# Patient Record
Sex: FEMALE | Race: WHITE | Employment: UNEMPLOYED | URBAN - METROPOLITAN AREA
[De-identification: names, ages, dates, MRNs, and addresses within clinical notes are randomized per-mention and may not be internally consistent; named-entity substitution may affect disease eponyms.]

---

## 2017-08-11 ENCOUNTER — HOSPITAL ENCOUNTER (EMERGENCY)
Age: 27
Discharge: HOME | End: 2017-08-11
Attending: EMERGENCY MEDICINE
Payer: MEDICAID

## 2017-08-11 VITALS
HEART RATE: 95 BPM | TEMPERATURE: 98.5 F | WEIGHT: 140 LBS | BODY MASS INDEX: 23.3 KG/M2 | RESPIRATION RATE: 16 BRPM | OXYGEN SATURATION: 99 % | SYSTOLIC BLOOD PRESSURE: 118 MMHG | DIASTOLIC BLOOD PRESSURE: 73 MMHG

## 2017-08-11 DIAGNOSIS — Z34.90 PREGNANCY, UNSPECIFIED GESTATIONAL AGE: Primary | ICD-10-CM

## 2017-08-11 DIAGNOSIS — R11.0 NAUSEA: ICD-10-CM

## 2017-08-11 LAB
BILIRUBIN URINE: NEGATIVE
CHP ED QC CHECK: YES
COLOR: YELLOW
COMMENT UA: ABNORMAL
GLUCOSE URINE: NEGATIVE
KETONES, URINE: ABNORMAL
LEUKOCYTE ESTERASE, URINE: NEGATIVE
NITRITE, URINE: NEGATIVE
PH UA: 5 (ref 5–8)
PREGNANCY TEST URINE, POC: POSITIVE
PROTEIN UA: NEGATIVE
SPECIFIC GRAVITY UA: 1.03 (ref 1–1.03)
TURBIDITY: CLEAR
URINE HGB: NEGATIVE
UROBILINOGEN, URINE: NORMAL

## 2017-08-11 PROCEDURE — G0383 LEV 4 HOSP TYPE B ED VISIT: HCPCS

## 2017-08-11 PROCEDURE — 81003 URINALYSIS AUTO W/O SCOPE: CPT

## 2017-08-11 PROCEDURE — 87086 URINE CULTURE/COLONY COUNT: CPT

## 2017-08-11 RX ORDER — LANOLIN ALCOHOL/MO/W.PET/CERES
50 CREAM (GRAM) TOPICAL DAILY
Qty: 30 TABLET | Refills: 0 | Status: ON HOLD | OUTPATIENT
Start: 2017-08-11 | End: 2017-11-11 | Stop reason: HOSPADM

## 2017-08-11 ASSESSMENT — ENCOUNTER SYMPTOMS
NAUSEA: 1
CONSTIPATION: 0
RHINORRHEA: 0
ABDOMINAL PAIN: 0
DIARRHEA: 0
SHORTNESS OF BREATH: 0
SORE THROAT: 0
VOMITING: 0
BACK PAIN: 0
CHEST TIGHTNESS: 0
BLOOD IN STOOL: 0

## 2017-08-12 LAB
CULTURE: NORMAL
CULTURE: NORMAL
Lab: NORMAL
SPECIMEN DESCRIPTION: NORMAL
STATUS: NORMAL

## 2017-08-28 ENCOUNTER — OFFICE VISIT (OUTPATIENT)
Dept: OBGYN | Age: 27
End: 2017-08-28
Payer: MEDICAID

## 2017-08-28 ENCOUNTER — HOSPITAL ENCOUNTER (OUTPATIENT)
Age: 27
Setting detail: SPECIMEN
Discharge: HOME | End: 2017-08-28
Payer: COMMERCIAL

## 2017-08-28 VITALS
HEIGHT: 65 IN | WEIGHT: 152.9 LBS | SYSTOLIC BLOOD PRESSURE: 110 MMHG | HEART RATE: 76 BPM | BODY MASS INDEX: 25.47 KG/M2 | DIASTOLIC BLOOD PRESSURE: 70 MMHG

## 2017-08-28 DIAGNOSIS — R87.613 HSIL ON PAP SMEAR OF CERVIX: ICD-10-CM

## 2017-08-28 DIAGNOSIS — Z34.90 PREGNANCY, UNSPECIFIED GESTATIONAL AGE: ICD-10-CM

## 2017-08-28 DIAGNOSIS — Z98.890 H/O LEEP: ICD-10-CM

## 2017-08-28 DIAGNOSIS — Z72.0 TOBACCO ABUSE: ICD-10-CM

## 2017-08-28 DIAGNOSIS — Z34.90 PREGNANCY, UNSPECIFIED GESTATIONAL AGE: Primary | ICD-10-CM

## 2017-08-28 DIAGNOSIS — Z32.01 POSITIVE PREGNANCY TEST: ICD-10-CM

## 2017-08-28 DIAGNOSIS — Z72.0 TOBACCO USE: ICD-10-CM

## 2017-08-28 LAB — HCG QUANTITATIVE: ABNORMAL IU/L

## 2017-08-28 PROCEDURE — 84702 CHORIONIC GONADOTROPIN TEST: CPT

## 2017-08-28 PROCEDURE — 36415 COLL VENOUS BLD VENIPUNCTURE: CPT

## 2017-08-28 PROCEDURE — 99202 OFFICE O/P NEW SF 15 MIN: CPT | Performed by: OBSTETRICS & GYNECOLOGY

## 2017-08-31 DIAGNOSIS — Z34.90 PREGNANCY, UNSPECIFIED GESTATIONAL AGE: Primary | ICD-10-CM

## 2017-09-11 ENCOUNTER — TELEPHONE (OUTPATIENT)
Dept: OBGYN | Age: 27
End: 2017-09-11

## 2017-10-12 ENCOUNTER — TELEPHONE (OUTPATIENT)
Dept: OBGYN | Age: 27
End: 2017-10-12

## 2017-11-10 ENCOUNTER — HOSPITAL ENCOUNTER (OUTPATIENT)
Age: 27
Discharge: HOME | End: 2017-11-10
Attending: OBSTETRICS & GYNECOLOGY | Admitting: OBSTETRICS & GYNECOLOGY
Payer: MEDICAID

## 2017-11-10 VITALS
DIASTOLIC BLOOD PRESSURE: 64 MMHG | TEMPERATURE: 98.4 F | SYSTOLIC BLOOD PRESSURE: 116 MMHG | HEART RATE: 105 BPM | RESPIRATION RATE: 15 BRPM

## 2017-11-10 PROCEDURE — 99234 HOSP IP/OBS SM DT SF/LOW 45: CPT | Performed by: OBSTETRICS & GYNECOLOGY

## 2017-11-10 PROCEDURE — 99213 OFFICE O/P EST LOW 20 MIN: CPT

## 2017-11-11 ENCOUNTER — ANESTHESIA EVENT (OUTPATIENT)
Dept: LABOR AND DELIVERY | Age: 27
End: 2017-11-11
Payer: MEDICAID

## 2017-11-11 ENCOUNTER — HOSPITAL ENCOUNTER (EMERGENCY)
Age: 27
Discharge: HOME | End: 2017-11-11
Attending: EMERGENCY MEDICINE
Payer: MEDICAID

## 2017-11-11 ENCOUNTER — HOSPITAL ENCOUNTER (OUTPATIENT)
Age: 27
Setting detail: OBSERVATION
Discharge: HOME | End: 2017-11-11
Attending: OBSTETRICS & GYNECOLOGY | Admitting: OBSTETRICS & GYNECOLOGY
Payer: MEDICAID

## 2017-11-11 ENCOUNTER — ANESTHESIA (OUTPATIENT)
Dept: LABOR AND DELIVERY | Age: 27
End: 2017-11-11
Payer: MEDICAID

## 2017-11-11 VITALS
TEMPERATURE: 97.6 F | WEIGHT: 153 LBS | BODY MASS INDEX: 25.49 KG/M2 | SYSTOLIC BLOOD PRESSURE: 114 MMHG | HEART RATE: 91 BPM | HEIGHT: 65 IN | DIASTOLIC BLOOD PRESSURE: 66 MMHG | OXYGEN SATURATION: 100 % | RESPIRATION RATE: 24 BRPM

## 2017-11-11 VITALS
HEART RATE: 67 BPM | WEIGHT: 153 LBS | RESPIRATION RATE: 16 BRPM | BODY MASS INDEX: 25.49 KG/M2 | SYSTOLIC BLOOD PRESSURE: 81 MMHG | OXYGEN SATURATION: 100 % | TEMPERATURE: 98.9 F | HEIGHT: 65 IN | DIASTOLIC BLOOD PRESSURE: 45 MMHG

## 2017-11-11 VITALS — DIASTOLIC BLOOD PRESSURE: 48 MMHG | SYSTOLIC BLOOD PRESSURE: 116 MMHG | OXYGEN SATURATION: 93 %

## 2017-11-11 DIAGNOSIS — O03.9 SPONTANEOUS ABORTION: Primary | ICD-10-CM

## 2017-11-11 PROBLEM — O42.112 PRETERM PREMATURE RUPTURE OF MEMBRANES (PPROM) WITH ONSET OF LABOR AFTER 24 HOURS OF RUPTURE IN SECOND TRIMESTER, ANTEPARTUM: Status: ACTIVE | Noted: 2017-11-11

## 2017-11-11 PROBLEM — O60.02 PRETERM LABOR IN SECOND TRIMESTER: Status: ACTIVE | Noted: 2017-11-11

## 2017-11-11 PROBLEM — Z32.01 POSITIVE PREGNANCY TEST: Status: RESOLVED | Noted: 2017-08-28 | Resolved: 2017-11-11

## 2017-11-11 PROBLEM — F12.91 HISTORY OF MARIJUANA USE: Status: ACTIVE | Noted: 2017-11-11

## 2017-11-11 PROBLEM — O36.4XX0 FETAL DEMISE AFFECTING DELIVERY: Status: ACTIVE | Noted: 2017-11-11

## 2017-11-11 LAB
ABO/RH: NORMAL
ABSOLUTE EOS #: 0 K/UL (ref 0–0.4)
ABSOLUTE IMMATURE GRANULOCYTE: ABNORMAL K/UL (ref 0–0.3)
ABSOLUTE LYMPH #: 2.42 K/UL (ref 1–4.8)
ABSOLUTE MONO #: 0.64 K/UL (ref 0.1–1.3)
ALBUMIN SERPL-MCNC: 3.5 G/DL (ref 3.5–5.2)
ALBUMIN/GLOBULIN RATIO: ABNORMAL (ref 1–2.5)
ALP BLD-CCNC: 88 U/L (ref 35–104)
ALT SERPL-CCNC: 14 U/L (ref 5–33)
ANION GAP SERPL CALCULATED.3IONS-SCNC: 14 MMOL/L (ref 9–17)
ANTIBODY SCREEN: NEGATIVE
ARM BAND NUMBER: NORMAL
AST SERPL-CCNC: 14 U/L
BASOPHILS # BLD: 0 %
BASOPHILS ABSOLUTE: 0 K/UL (ref 0–0.2)
BILIRUB SERPL-MCNC: <0.15 MG/DL (ref 0.3–1.2)
BLOOD BANK COMMENT: NORMAL
BUN BLDV-MCNC: 10 MG/DL (ref 6–20)
BUN/CREAT BLD: ABNORMAL (ref 9–20)
CALCIUM SERPL-MCNC: 9.3 MG/DL (ref 8.6–10.4)
CHLORIDE BLD-SCNC: 102 MMOL/L (ref 98–107)
CO2: 20 MMOL/L (ref 20–31)
CREAT SERPL-MCNC: <0.4 MG/DL (ref 0.5–0.9)
DIFFERENTIAL TYPE: ABNORMAL
EOSINOPHILS RELATIVE PERCENT: 0 %
EXPIRATION DATE: NORMAL
FIBRINOGEN: 468 MG/DL (ref 170–410)
GFR AFRICAN AMERICAN: ABNORMAL ML/MIN
GFR NON-AFRICAN AMERICAN: ABNORMAL ML/MIN
GFR SERPL CREATININE-BSD FRML MDRD: ABNORMAL ML/MIN/{1.73_M2}
GFR SERPL CREATININE-BSD FRML MDRD: ABNORMAL ML/MIN/{1.73_M2}
GLUCOSE BLD-MCNC: 99 MG/DL (ref 70–99)
HCT VFR BLD CALC: 34.6 % (ref 36–46)
HEMOGLOBIN: 11.9 G/DL (ref 12–16)
IMMATURE GRANULOCYTES: ABNORMAL %
INR BLD: 0.9
LYMPHOCYTES # BLD: 15 %
MAGNESIUM: 1.8 MG/DL (ref 1.6–2.6)
MCH RBC QN AUTO: 32 PG (ref 26–34)
MCHC RBC AUTO-ENTMCNC: 34.4 G/DL (ref 31–37)
MCV RBC AUTO: 93.1 FL (ref 80–100)
MONOCYTES # BLD: 4 %
MORPHOLOGY: ABNORMAL
PARTIAL THROMBOPLASTIN TIME: 28.3 SEC (ref 23–31)
PDW BLD-RTO: 13.1 % (ref 11.5–14.9)
PLATELET # BLD: 253 K/UL (ref 150–450)
PLATELET ESTIMATE: ABNORMAL
PMV BLD AUTO: 8 FL (ref 6–12)
POTASSIUM SERPL-SCNC: 3.9 MMOL/L (ref 3.7–5.3)
PROTHROMBIN TIME: 9.4 SEC (ref 9.7–12)
RBC # BLD: 3.71 M/UL (ref 4–5.2)
RBC # BLD: ABNORMAL 10*6/UL
SEG NEUTROPHILS: 81 %
SEGMENTED NEUTROPHILS ABSOLUTE COUNT: 13.04 K/UL (ref 1.3–9.1)
SODIUM BLD-SCNC: 136 MMOL/L (ref 135–144)
TOTAL PROTEIN: 6.5 G/DL (ref 6.4–8.3)
WBC # BLD: 16.1 K/UL (ref 3.5–11)
WBC # BLD: ABNORMAL 10*3/UL

## 2017-11-11 PROCEDURE — 6370000000 HC RX 637 (ALT 250 FOR IP): Performed by: OBSTETRICS & GYNECOLOGY

## 2017-11-11 PROCEDURE — 3600000002 HC SURGERY LEVEL 2 BASE: Performed by: OBSTETRICS & GYNECOLOGY

## 2017-11-11 PROCEDURE — 86850 RBC ANTIBODY SCREEN: CPT

## 2017-11-11 PROCEDURE — 6360000002 HC RX W HCPCS

## 2017-11-11 PROCEDURE — 2580000003 HC RX 258: Performed by: ANESTHESIOLOGY

## 2017-11-11 PROCEDURE — 6360000002 HC RX W HCPCS: Performed by: OBSTETRICS & GYNECOLOGY

## 2017-11-11 PROCEDURE — 3700000001 HC ADD 15 MINUTES (ANESTHESIA)

## 2017-11-11 PROCEDURE — 3609079900 HC CESAREAN SECTION: Performed by: OBSTETRICS & GYNECOLOGY

## 2017-11-11 PROCEDURE — 7100000000 HC PACU RECOVERY - FIRST 15 MIN: Performed by: OBSTETRICS & GYNECOLOGY

## 2017-11-11 PROCEDURE — 2500000003 HC RX 250 WO HCPCS: Performed by: ANESTHESIOLOGY

## 2017-11-11 PROCEDURE — 96361 HYDRATE IV INFUSION ADD-ON: CPT

## 2017-11-11 PROCEDURE — 96365 THER/PROPH/DIAG IV INF INIT: CPT

## 2017-11-11 PROCEDURE — 85384 FIBRINOGEN ACTIVITY: CPT

## 2017-11-11 PROCEDURE — 6370000000 HC RX 637 (ALT 250 FOR IP): Performed by: STUDENT IN AN ORGANIZED HEALTH CARE EDUCATION/TRAINING PROGRAM

## 2017-11-11 PROCEDURE — 7100000001 HC PACU RECOVERY - ADDTL 15 MIN: Performed by: OBSTETRICS & GYNECOLOGY

## 2017-11-11 PROCEDURE — 2580000003 HC RX 258: Performed by: OBSTETRICS & GYNECOLOGY

## 2017-11-11 PROCEDURE — 3700000001 HC ADD 15 MINUTES (ANESTHESIA): Performed by: OBSTETRICS & GYNECOLOGY

## 2017-11-11 PROCEDURE — 86901 BLOOD TYPING SEROLOGIC RH(D): CPT

## 2017-11-11 PROCEDURE — 83735 ASSAY OF MAGNESIUM: CPT

## 2017-11-11 PROCEDURE — 2500000003 HC RX 250 WO HCPCS

## 2017-11-11 PROCEDURE — 6370000000 HC RX 637 (ALT 250 FOR IP)

## 2017-11-11 PROCEDURE — G0378 HOSPITAL OBSERVATION PER HR: HCPCS

## 2017-11-11 PROCEDURE — 59200 INSERT CERVICAL DILATOR: CPT

## 2017-11-11 PROCEDURE — 2580000003 HC RX 258: Performed by: EMERGENCY MEDICINE

## 2017-11-11 PROCEDURE — 3700000000 HC ANESTHESIA ATTENDED CARE

## 2017-11-11 PROCEDURE — 88305 TISSUE EXAM BY PATHOLOGIST: CPT

## 2017-11-11 PROCEDURE — 86900 BLOOD TYPING SEROLOGIC ABO: CPT

## 2017-11-11 PROCEDURE — 85025 COMPLETE CBC W/AUTO DIFF WBC: CPT

## 2017-11-11 PROCEDURE — 96375 TX/PRO/DX INJ NEW DRUG ADDON: CPT

## 2017-11-11 PROCEDURE — 36415 COLL VENOUS BLD VENIPUNCTURE: CPT

## 2017-11-11 PROCEDURE — 80053 COMPREHEN METABOLIC PANEL: CPT

## 2017-11-11 PROCEDURE — 96372 THER/PROPH/DIAG INJ SC/IM: CPT

## 2017-11-11 PROCEDURE — 96360 HYDRATION IV INFUSION INIT: CPT

## 2017-11-11 PROCEDURE — 59414 DELIVER PLACENTA: CPT | Performed by: OBSTETRICS & GYNECOLOGY

## 2017-11-11 PROCEDURE — 3700000025 ANESTHESIA EPIDURAL BLOCK: Performed by: ANESTHESIOLOGY

## 2017-11-11 PROCEDURE — 99285 EMERGENCY DEPT VISIT HI MDM: CPT

## 2017-11-11 PROCEDURE — 85730 THROMBOPLASTIN TIME PARTIAL: CPT

## 2017-11-11 PROCEDURE — 96366 THER/PROPH/DIAG IV INF ADDON: CPT

## 2017-11-11 PROCEDURE — 3600000012 HC SURGERY LEVEL 2 ADDTL 15MIN: Performed by: OBSTETRICS & GYNECOLOGY

## 2017-11-11 PROCEDURE — 85610 PROTHROMBIN TIME: CPT

## 2017-11-11 PROCEDURE — 3700000000 HC ANESTHESIA ATTENDED CARE: Performed by: OBSTETRICS & GYNECOLOGY

## 2017-11-11 PROCEDURE — 6360000002 HC RX W HCPCS: Performed by: ANESTHESIOLOGY

## 2017-11-11 RX ORDER — SODIUM CHLORIDE, SODIUM LACTATE, POTASSIUM CHLORIDE, CALCIUM CHLORIDE 600; 310; 30; 20 MG/100ML; MG/100ML; MG/100ML; MG/100ML
INJECTION, SOLUTION INTRAVENOUS CONTINUOUS PRN
Status: DISCONTINUED | OUTPATIENT
Start: 2017-11-11 | End: 2017-11-12 | Stop reason: SDUPTHER

## 2017-11-11 RX ORDER — HYDROCODONE BITARTRATE AND ACETAMINOPHEN 5; 325 MG/1; MG/1
2 TABLET ORAL EVERY 4 HOURS PRN
Status: DISCONTINUED | OUTPATIENT
Start: 2017-11-11 | End: 2017-11-11 | Stop reason: HOSPADM

## 2017-11-11 RX ORDER — ACETAMINOPHEN 500 MG
1000 TABLET ORAL ONCE
Status: DISCONTINUED | OUTPATIENT
Start: 2017-11-11 | End: 2017-11-11 | Stop reason: HOSPADM

## 2017-11-11 RX ORDER — SODIUM CHLORIDE 0.9 % (FLUSH) 0.9 %
10 SYRINGE (ML) INJECTION PRN
Status: DISCONTINUED | OUTPATIENT
Start: 2017-11-11 | End: 2017-11-11

## 2017-11-11 RX ORDER — MORPHINE SULFATE 10 MG/ML
10 INJECTION, SOLUTION INTRAMUSCULAR; INTRAVENOUS ONCE
Status: COMPLETED | OUTPATIENT
Start: 2017-11-11 | End: 2017-11-11

## 2017-11-11 RX ORDER — HYDROCODONE BITARTRATE AND ACETAMINOPHEN 5; 325 MG/1; MG/1
1 TABLET ORAL EVERY 6 HOURS PRN
Qty: 5 TABLET | Refills: 0 | Status: SHIPPED | OUTPATIENT
Start: 2017-11-11 | End: 2017-11-18

## 2017-11-11 RX ORDER — NALOXONE HYDROCHLORIDE 0.4 MG/ML
0.4 INJECTION, SOLUTION INTRAMUSCULAR; INTRAVENOUS; SUBCUTANEOUS PRN
Status: DISCONTINUED | OUTPATIENT
Start: 2017-11-11 | End: 2017-11-11

## 2017-11-11 RX ORDER — HYDROCODONE BITARTRATE AND ACETAMINOPHEN 5; 325 MG/1; MG/1
1 TABLET ORAL EVERY 4 HOURS PRN
Status: DISCONTINUED | OUTPATIENT
Start: 2017-11-11 | End: 2017-11-11 | Stop reason: HOSPADM

## 2017-11-11 RX ORDER — FENTANYL CITRATE 50 UG/ML
INJECTION, SOLUTION INTRAMUSCULAR; INTRAVENOUS PRN
Status: DISCONTINUED | OUTPATIENT
Start: 2017-11-11 | End: 2017-11-12 | Stop reason: SDUPTHER

## 2017-11-11 RX ORDER — ROPIVACAINE HYDROCHLORIDE 2 MG/ML
INJECTION, SOLUTION EPIDURAL; INFILTRATION; PERINEURAL CONTINUOUS PRN
Status: DISCONTINUED | OUTPATIENT
Start: 2017-11-11 | End: 2017-11-12 | Stop reason: SDUPTHER

## 2017-11-11 RX ORDER — DOXYCYCLINE 100 MG/1
200 CAPSULE ORAL ONCE
Status: COMPLETED | OUTPATIENT
Start: 2017-11-11 | End: 2017-11-11

## 2017-11-11 RX ORDER — LIDOCAINE HYDROCHLORIDE 10 MG/ML
INJECTION, SOLUTION EPIDURAL; INFILTRATION; INTRACAUDAL; PERINEURAL PRN
Status: DISCONTINUED | OUTPATIENT
Start: 2017-11-11 | End: 2017-11-11 | Stop reason: SDUPTHER

## 2017-11-11 RX ORDER — NALBUPHINE HCL 10 MG/ML
10 AMPUL (ML) INJECTION ONCE
Status: COMPLETED | OUTPATIENT
Start: 2017-11-11 | End: 2017-11-11

## 2017-11-11 RX ORDER — IBUPROFEN 800 MG/1
800 TABLET ORAL EVERY 8 HOURS PRN
Qty: 30 TABLET | Refills: 0 | Status: SHIPPED | OUTPATIENT
Start: 2017-11-11 | End: 2020-06-13

## 2017-11-11 RX ORDER — EPHEDRINE SULFATE 50 MG/ML
INJECTION, SOLUTION INTRAVENOUS PRN
Status: DISCONTINUED | OUTPATIENT
Start: 2017-11-11 | End: 2017-11-11 | Stop reason: SDUPTHER

## 2017-11-11 RX ORDER — ONDANSETRON 2 MG/ML
4 INJECTION INTRAMUSCULAR; INTRAVENOUS EVERY 6 HOURS PRN
Status: DISCONTINUED | OUTPATIENT
Start: 2017-11-11 | End: 2017-11-11 | Stop reason: HOSPADM

## 2017-11-11 RX ORDER — ONDANSETRON 2 MG/ML
4 INJECTION INTRAMUSCULAR; INTRAVENOUS EVERY 6 HOURS PRN
Status: DISCONTINUED | OUTPATIENT
Start: 2017-11-11 | End: 2017-11-11

## 2017-11-11 RX ORDER — PROPOFOL 10 MG/ML
INJECTION, EMULSION INTRAVENOUS PRN
Status: DISCONTINUED | OUTPATIENT
Start: 2017-11-11 | End: 2017-11-11 | Stop reason: SDUPTHER

## 2017-11-11 RX ORDER — SODIUM CHLORIDE, SODIUM LACTATE, POTASSIUM CHLORIDE, CALCIUM CHLORIDE 600; 310; 30; 20 MG/100ML; MG/100ML; MG/100ML; MG/100ML
INJECTION, SOLUTION INTRAVENOUS CONTINUOUS
Status: DISCONTINUED | OUTPATIENT
Start: 2017-11-11 | End: 2017-11-11

## 2017-11-11 RX ORDER — FENTANYL CITRATE 50 UG/ML
INJECTION, SOLUTION INTRAMUSCULAR; INTRAVENOUS
Status: COMPLETED
Start: 2017-11-11 | End: 2017-11-11

## 2017-11-11 RX ORDER — MIDAZOLAM HYDROCHLORIDE 1 MG/ML
INJECTION INTRAMUSCULAR; INTRAVENOUS PRN
Status: DISCONTINUED | OUTPATIENT
Start: 2017-11-11 | End: 2017-11-11 | Stop reason: SDUPTHER

## 2017-11-11 RX ORDER — EPHEDRINE SULFATE 50 MG/ML
INJECTION, SOLUTION INTRAVENOUS
Status: DISCONTINUED
Start: 2017-11-11 | End: 2017-11-11 | Stop reason: WASHOUT

## 2017-11-11 RX ORDER — MISOPROSTOL 200 UG/1
400 TABLET ORAL
Status: DISCONTINUED | OUTPATIENT
Start: 2017-11-11 | End: 2017-11-11

## 2017-11-11 RX ORDER — PROMETHAZINE HYDROCHLORIDE 25 MG/ML
25 INJECTION, SOLUTION INTRAMUSCULAR; INTRAVENOUS ONCE
Status: COMPLETED | OUTPATIENT
Start: 2017-11-11 | End: 2017-11-11

## 2017-11-11 RX ORDER — 0.9 % SODIUM CHLORIDE 0.9 %
1000 INTRAVENOUS SOLUTION INTRAVENOUS ONCE
Status: COMPLETED | OUTPATIENT
Start: 2017-11-11 | End: 2017-11-11

## 2017-11-11 RX ORDER — ACETAMINOPHEN 500 MG
1000 TABLET ORAL EVERY 6 HOURS PRN
Status: DISCONTINUED | OUTPATIENT
Start: 2017-11-11 | End: 2017-11-11

## 2017-11-11 RX ORDER — BUPIVACAINE HYDROCHLORIDE AND EPINEPHRINE 5; 5 MG/ML; UG/ML
INJECTION, SOLUTION EPIDURAL; INTRACAUDAL; PERINEURAL PRN
Status: DISCONTINUED | OUTPATIENT
Start: 2017-11-11 | End: 2017-11-12 | Stop reason: SDUPTHER

## 2017-11-11 RX ORDER — DOXYCYCLINE 100 MG/1
100 CAPSULE ORAL ONCE
Status: COMPLETED | OUTPATIENT
Start: 2017-11-11 | End: 2017-11-11

## 2017-11-11 RX ORDER — GLYCOPYRROLATE 0.2 MG/ML
INJECTION INTRAMUSCULAR; INTRAVENOUS PRN
Status: DISCONTINUED | OUTPATIENT
Start: 2017-11-11 | End: 2017-11-11 | Stop reason: SDUPTHER

## 2017-11-11 RX ORDER — IBUPROFEN 800 MG/1
800 TABLET ORAL EVERY 8 HOURS PRN
Status: DISCONTINUED | OUTPATIENT
Start: 2017-11-11 | End: 2017-11-11 | Stop reason: HOSPADM

## 2017-11-11 RX ORDER — DOCUSATE SODIUM 100 MG/1
100 CAPSULE, LIQUID FILLED ORAL 2 TIMES DAILY PRN
Qty: 60 CAPSULE | Refills: 0 | Status: SHIPPED | OUTPATIENT
Start: 2017-11-11 | End: 2020-06-13

## 2017-11-11 RX ORDER — KETOROLAC TROMETHAMINE 30 MG/ML
INJECTION, SOLUTION INTRAMUSCULAR; INTRAVENOUS PRN
Status: DISCONTINUED | OUTPATIENT
Start: 2017-11-11 | End: 2017-11-11 | Stop reason: SDUPTHER

## 2017-11-11 RX ADMIN — SODIUM CHLORIDE, POTASSIUM CHLORIDE, SODIUM LACTATE AND CALCIUM CHLORIDE: 600; 310; 30; 20 INJECTION, SOLUTION INTRAVENOUS at 08:51

## 2017-11-11 RX ADMIN — GLYCOPYRROLATE 0.2 MG: 0.2 INJECTION, SOLUTION INTRAMUSCULAR; INTRAVENOUS at 09:46

## 2017-11-11 RX ADMIN — DOXYCYCLINE 100 MG: 100 CAPSULE ORAL at 08:58

## 2017-11-11 RX ADMIN — MISOPROSTOL 400 MCG: 200 TABLET ORAL at 01:46

## 2017-11-11 RX ADMIN — SODIUM CITRATE AND CITRIC ACID MONOHYDRATE 30 ML: 500; 334 SOLUTION ORAL at 08:38

## 2017-11-11 RX ADMIN — DOXYCYCLINE 200 MG: 100 CAPSULE ORAL at 11:37

## 2017-11-11 RX ADMIN — Medication 8 ML/HR: at 03:48

## 2017-11-11 RX ADMIN — LIDOCAINE HYDROCHLORIDE 50 MG: 10 INJECTION, SOLUTION EPIDURAL; INFILTRATION; INTRACAUDAL; PERINEURAL at 09:17

## 2017-11-11 RX ADMIN — Medication 500 ML/HR: at 09:32

## 2017-11-11 RX ADMIN — MISOPROSTOL 400 MCG: 200 TABLET ORAL at 07:59

## 2017-11-11 RX ADMIN — MORPHINE SULFATE 10 MG: 10 INJECTION, SOLUTION INTRAMUSCULAR; INTRAVENOUS at 02:42

## 2017-11-11 RX ADMIN — SODIUM CHLORIDE, POTASSIUM CHLORIDE, SODIUM LACTATE AND CALCIUM CHLORIDE: 600; 310; 30; 20 INJECTION, SOLUTION INTRAVENOUS at 03:15

## 2017-11-11 RX ADMIN — BUPIVACAINE HYDROCHLORIDE AND EPINEPHRINE 2 MG: 5; 5 INJECTION, SOLUTION EPIDURAL; INTRACAUDAL; PERINEURAL at 03:46

## 2017-11-11 RX ADMIN — ROPIVACAINE HYDROCHLORIDE 8 ML/HR: 2 INJECTION, SOLUTION EPIDURAL; INFILTRATION at 03:46

## 2017-11-11 RX ADMIN — KETOROLAC TROMETHAMINE 30 MG: 30 INJECTION, SOLUTION INTRAMUSCULAR; INTRAVENOUS at 09:33

## 2017-11-11 RX ADMIN — NALBUPHINE HYDROCHLORIDE 10 MG: 10 INJECTION, SOLUTION INTRAMUSCULAR; INTRAVENOUS; SUBCUTANEOUS at 01:40

## 2017-11-11 RX ADMIN — MISOPROSTOL 400 MCG: 200 TABLET ORAL at 04:49

## 2017-11-11 RX ADMIN — PROMETHAZINE HYDROCHLORIDE 25 MG: 25 INJECTION INTRAMUSCULAR; INTRAVENOUS at 02:42

## 2017-11-11 RX ADMIN — SODIUM CHLORIDE, POTASSIUM CHLORIDE, SODIUM LACTATE AND CALCIUM CHLORIDE: 600; 310; 30; 20 INJECTION, SOLUTION INTRAVENOUS at 01:43

## 2017-11-11 RX ADMIN — EPHEDRINE SULFATE 10 MG: 50 INJECTION INTRAMUSCULAR; INTRAVENOUS; SUBCUTANEOUS at 09:39

## 2017-11-11 RX ADMIN — SODIUM CHLORIDE 1000 ML: 9 INJECTION, SOLUTION INTRAVENOUS at 00:45

## 2017-11-11 RX ADMIN — FENTANYL CITRATE 22 MCG: 50 INJECTION, SOLUTION INTRAMUSCULAR; INTRAVENOUS at 03:46

## 2017-11-11 RX ADMIN — PHENYLEPHRINE HYDROCHLORIDE 100 MCG: 10 INJECTION INTRAVENOUS at 09:36

## 2017-11-11 RX ADMIN — HYDROCODONE BITARTRATE AND ACETAMINOPHEN 2 TABLET: 5; 325 TABLET ORAL at 11:37

## 2017-11-11 RX ADMIN — FENTANYL CITRATE 20 MCG: 50 INJECTION INTRAMUSCULAR; INTRAVENOUS at 03:38

## 2017-11-11 RX ADMIN — MIDAZOLAM HYDROCHLORIDE 2 MG: 1 INJECTION, SOLUTION INTRAMUSCULAR; INTRAVENOUS at 09:12

## 2017-11-11 RX ADMIN — PROPOFOL 120 MG: 10 INJECTION, EMULSION INTRAVENOUS at 09:17

## 2017-11-11 RX ADMIN — Medication 8 ML/HR: at 07:42

## 2017-11-11 ASSESSMENT — PAIN SCALES - GENERAL
PAINLEVEL_OUTOF10: 10
PAINLEVEL_OUTOF10: 2
PAINLEVEL_OUTOF10: 10
PAINLEVEL_OUTOF10: 7
PAINLEVEL_OUTOF10: 7
PAINLEVEL_OUTOF10: 4
PAINLEVEL_OUTOF10: 7
PAINLEVEL_OUTOF10: 10

## 2017-11-11 ASSESSMENT — LIFESTYLE VARIABLES
SMOKING_STATUS: 1
SMOKING_STATUS: 0

## 2017-11-11 ASSESSMENT — ENCOUNTER SYMPTOMS
STRIDOR: 0
SHORTNESS OF BREATH: 0

## 2017-11-11 ASSESSMENT — PAIN DESCRIPTION - PAIN TYPE: TYPE: ACUTE PAIN

## 2017-11-11 NOTE — BRIEF OP NOTE
Brief Operative Note  Department of Obstetrics and Gynecology  [facility]     Patient: Sarah Wang   : 1990  MRN: [id number]       Acct: [de-identified]   Date of Procedure: 17     Pre-operative Diagnosis: 33 y.o. female    Spontaneous  labor at 20w4d, Fetal demise, PPROM, Fetal diaphragmatic hernia, maternal tobacco use and H/O marijuana use, retained placenta    Post-operative Diagnosis: same    Procedure: Manual removal of placenta under anesthesia    Surgeon: Dr. Micheline Reynolds DO     Assistant(s): Huey Nichols DO PGY2    Anesthesia: general    Findings:  Placenta found to be in vagina. Normal appearing cervix. Anteverted uterus 2 cm below umbilicus  Total IV fluids/Blood products:  600 ml   Urine Output:  75 ml    Estimated blood loss:  10 ml of old blood  Drains:  vázquez catheter  Specimens:  Placenta and membranes  Instrument and Sponge Count: Correct  Complications:  none  Condition:  good, transferred to post anesthesia recovery    See full operative report for further details.       Huey Nichols DO  Ob/Gyn Resident   2017, 9:40 AM

## 2017-11-11 NOTE — PROGRESS NOTES
Date: 2017  Time: 9:40 AM    Patient Name: Jaquelin Wilks  Patient : 1990  Room/Bed: 8847/5390-68  Admission Date/Time: 2017  1:21 AM  MRN #: [id number]  [facility] #: [id number]      Attending Attestation:   I was present for the entire procedure.     Attending Name:  Rudi Yanes DO

## 2017-11-11 NOTE — FLOWSHEET NOTE
Pt presents to L and D, accompanied by boyfriend, via wheelchair. Pt here for SROM on Tuesday. Pt states she has been to [facility], where they found fetal anomalies, so they referred her to [facility]. From [place], she was referred to a family planning clinic in [place] to induce. Pt was denied at the family planning clinic due to rupture of membranes. Pt states she came her for another option. Pt states she has had vaginal bleeding since Tuesday, and today she began to have intense lower back pain, rating 6/10. Pt is afebrile. Will confirm FHTs with US. Pt gowned and in bed, oriented to room and call light. EFM explained and applied. Dr. Capri Costello notified of admission.

## 2017-11-11 NOTE — OP NOTE
Operative Note  Department of Obstetrics and Gynecology  [facility]       Patient: Sarah Wang   : 1990  MRN: [id number]                                                    Acct: [de-identified]   Date of Procedure: 17      Pre-operative Diagnosis: 33 y.o. female    Spontaneous  labor at 20w4d, Fetal demise, PPROM, Fetal diaphragmatic hernia, maternal tobacco use and H/O marijuana use, retained placenta     Post-operative Diagnosis: same     Procedure: Manual removal of placenta under anesthesia     Surgeon: Dr. Micheline Reynolds DO     Assistant(s): Huey Nichols DO PGY2     Anesthesia: general    Indications: This is a 33year old female that presented with vaginal pressure after she had PPROM on 17. She was seen at [facility] on 11/10 with FHTs noted. She then felt feet at the introitus while at home and called EMS. At [facility] she delivered a non-viable male infant with apgars of 0 @ 1 min and 0 @ 5 min. She received 3 doses of buccal cytotec 400 mcg to help with the removal of the placenta without success. The patient then requested surgery for removal of the placenta. All risks, benefits and alternatives were discussed with the patient and the decision was made to proceed with Suction Dilation and Curettage for removal of placenta. Procedure Details: The patient was seen in the pre-op room. The risks, benefits, complications, treatment options, and expected outcomes were discussed with the patient. The patient concurred with the proposed plan, giving informed consent. The patient was taken to the Operating Room and identified as Sarah Wang and the procedure was verified. A Time Out was held and the above information confirmed. After administration of general anesthesia, the patient was placed in the dorsolithotomy position and examination under general anesthesia performed with findings as noted above.  The patient was prepped and draped in the usual sterile fashion. The bladder was draining with a vázquez catheter in place, and a weighted speculum was placed into the vagina to visualize the cervix. The placenta was found to be coming into the vaginal vault. It was grasped with a ringed forceps and teased out and removed in one piece. The vagina and uterus were cleared of all clots and hemostasis was observed. Pitocin was started following removal of placenta. Bimanual exam revealed an empty uterus with no products of conception present. All instruments were then removed. Hemostasis was visualized. Instrument, sponge, and needle counts were correct at the conclusion of the case. SCDs for DVT prophylaxis remain in place for the post operative period. Dr. Davide White was present for the entire operation. Findings:  Placenta found to be in vagina. Normal appearing cervix.  Anteverted uterus 2 cm below umbilicus  Total IV fluids/Blood products:  600 ml   Urine Output:  75 ml    Estimated blood loss:  10 ml of old blood  Drains:  vázquez catheter  Specimens:  Placenta and membranes  Instrument and Sponge Count: Correct  Complications:  none  Condition:  good, transferred to post anesthesia recovery    Samir Bailey DO  Ob/Gyn Resident  11/11/2017, 9:45 AM

## 2017-11-11 NOTE — ANESTHESIA PRE PROCEDURE
Q89.7    H/O marijuana use Z87.898    Fetal demise (G2, 22w2d) O36. 4XX0       Past Medical History:  History reviewed. No pertinent past medical history. Past Surgical History:        Procedure Laterality Date    LEEP  4/26/16       Social History:    Social History   Substance Use Topics    Smoking status: Current Every Day Smoker     Packs/day: 0.50     Years: 7.00     Types: Cigarettes    Smokeless tobacco: Never Used    Alcohol use No                                Ready to quit: Not Answered  Counseling given: Not Answered      Vital Signs (Current):   Vitals:    11/11/17 0108   BP: (!) 100/56   Pulse: 90   Resp: 20   Temp: 98.7 °F (37.1 °C)   TempSrc: Oral                                              BP Readings from Last 3 Encounters:   11/11/17 (!) 100/56   11/11/17 114/66   11/10/17 116/64       NPO Status:                                                                                 BMI:   Wt Readings from Last 3 Encounters:   11/11/17 153 lb (69.4 kg)   08/28/17 152 lb 14.4 oz (69.4 kg)   08/11/17 140 lb (63.5 kg)     There is no height or weight on file to calculate BMI.    CBC:   Lab Results   Component Value Date    WBC 16.1 11/11/2017    RBC 3.71 11/11/2017    HGB 11.9 11/11/2017    HCT 34.6 11/11/2017    MCV 93.1 11/11/2017    RDW 13.1 11/11/2017     11/11/2017     LR    CMP:   Lab Results   Component Value Date     11/11/2017    K 3.9 11/11/2017     11/11/2017    CO2 20 11/11/2017    BUN 10 11/11/2017    CREATININE <0.40 11/11/2017    GFRAA CANNOT BE CALCULATED 11/11/2017    LABGLOM CANNOT BE CALCULATED 11/11/2017    GLUCOSE 99 11/11/2017    PROT 6.5 11/11/2017    CALCIUM 9.3 11/11/2017    BILITOT <0.15 11/11/2017    ALKPHOS 88 11/11/2017    AST 14 11/11/2017    ALT 14 11/11/2017       POC Tests: No results for input(s): POCGLU, POCNA, POCK, POCCL, POCBUN, POCHEMO, POCHCT in the last 72 hours.     Coags:   Lab Results   Component Value Date    PROTIME 9.4 11/11/2017

## 2017-11-11 NOTE — ANESTHESIA POSTPROCEDURE EVALUATION
POST- ANESTHESIA EVALUATION       Pt Name: Danny Vasques  MRN: [id number]  YOB: 1990  Date of evaluation: 2017  Time:  4:55 PM      BP (!) 81/45   Pulse 67   Temp 98.9 °F (37.2 °C) (Oral)   Resp 16   Ht 5' 5\" (1.651 m)   Wt 153 lb (69.4 kg)   LMP 2017 (Approximate)   SpO2 100%   BMI 25.46 kg/m²      Consciousness Level  Awake  Cardiopulmonary Status  Stable  Pain Adequately Treated YES  Nausea / Vomiting  NO  Adequate Hydration  YES  Anesthesia Related Complications NONE      Electronically signed by Michoacano George MD on 2017 at 4:55 PM       Department of Anesthesiology  Postprocedure Note    Patient: Danny Vasques  MRN: [id number]  YOB: 1990  Date of evaluation: 2017  Time:  4:55 PM     Procedure Summary     Date:  17 Room / Location:  [facility] L&D OR    Anesthesia Start:  315 Anesthesia Stop:      Procedures:        SECTION (N/A )      DILATATION AND CURETTAGE (N/A Vagina )      ANESTHESIA LABOR ANALGESIA Diagnosis:  (Other)    Surgeon:  Chele De Leon DO Responsible Provider:  Sharyon Councilman, MD    Anesthesia Type:  epidural ASA Status:  2          Anesthesia Type: epidural    Raul Phase I: Raul Score: 8    Raul Phase II: Raul Score: 10    Last vitals: Reviewed and per EMR flowsheets.        Anesthesia Post Evaluation

## 2017-11-11 NOTE — LETTER
[address] [place] [address]  Phone: [phone]        November 11, 2017     Patient: Rosezetta Dancer   YOB: 1990   Date of Visit: 11/11/2017       To Whom It May Concern: It is my medical opinion that Rosezetta Dancer may return to work on Monday, November 20, 2017 due to her medical condition. If you have any questions or concerns, please don't hesitate to call.     Sincerely,        Matheus Moreno, DO  Ob/Gyn Resident PGY-2  11/11/2017, 10:23 AM

## 2017-11-11 NOTE — PROGRESS NOTES
Patient seen and evaluated. Reports that she thinks the placenta is out. Vitals:    11/11/17 0346 11/11/17 0348 11/11/17 0350 11/11/17 0357   BP: 98/60 (!) 95/48 (!) 97/53 (!) 98/54   Pulse: 86 79 79 77   Resp: 18 16 16 16   Temp:       TempSrc:         Pelvic: vagina cleaned of about 200cc clots and dark blood, placenta remains in place    Will continue current management and close observation. Next dose of cytotec to be given at 0445. Reassurance given to the patient and her partner. All questions answered. Patient vocalized understanding.      Electronically signed by Yusef Patton DO on 11/11/2017 at 4:31 AM

## 2017-11-11 NOTE — FLOWSHEET NOTE
Pt felt like she had passed placenta Dr Zhu accompanied me to room relates clots aprox 200 ml noted of blood loss no active bleeding noted vss pt comfortable .

## 2017-11-11 NOTE — DISCHARGE SUMMARY
for a Suction D&C. Once under general anesthesia, the placenta was found to be in the vaginal vault and removed manually with ringed forceps. Delivery of non-viable male infant with apgars of 0 @ 1 min and 0 @ 5 min. Postpartum course: normal.      Course of patient: complicated by retained placenta that was removed successfully following manual removal under anesthesia. Discharge to: Home    Readmission planned: no     Recommendations on Discharge:     Medications:      Medication List      START taking these medications    docusate sodium 100 MG capsule  Commonly known as:  COLACE  Take 1 capsule by mouth 2 times daily as needed for Constipation     HYDROcodone-acetaminophen 5-325 MG per tablet  Commonly known as:  NORCO  Take 1 tablet by mouth every 6 hours as needed for Pain . ibuprofen 800 MG tablet  Commonly known as:  ADVIL;MOTRIN  Take 1 tablet by mouth every 8 hours as needed for Pain        STOP taking these medications    doxyLAMINE succinate 25 MG tablet  Commonly known as:  UNISOM     PRENATAL VITAMINS PLUS 27-1 MG Tabs tablet     vitamin B-6 50 MG tablet  Commonly known as:  PYRIDOXINE           Where to Get Your Medications      You can get these medications from any pharmacy    Bring a paper prescription for each of these medications  · docusate sodium 100 MG capsule  · HYDROcodone-acetaminophen 5-325 MG per tablet  · ibuprofen 800 MG tablet           Activity: pelvic rest x 6 weeks, no driving on narcotics, no lifting greater than 15 lbs  Diet: regular diet  Follow up: 2 weeks     Condition on discharge: good    Discharge date: 11/11/17      Matheus Moreno DO  Ob/Gyn Resident    Comments:  Home care and follow-up care were reviewed. Pelvic rest, and birth control were reviewed.

## 2017-11-11 NOTE — PROGRESS NOTES
Called to patient's room per RN. Patient would like to expedite the process for delivering the placenta. She does not wish to prolong the process. She requests surgery for removal of the placenta. All risks, benefits and alternatives were discussed with the patient. Will proceed with the Suction dilation and curettage as planned. Anesthesia and Dr. Susanne Viveros notified. Plan for Suction D&C on L&D @ 0900. Consent signed and in chart. Will give patient one more dose of cytotec at this time.     Sharita Ambrose,   Ob/Gyn Resident PGY-2  11/11/2017, 7:59 AM

## 2017-11-11 NOTE — ED PROVIDER NOTES
Constitutional: She is oriented to person, place, and time. She appears well-developed and well-nourished. No distress. HENT:   Head: Normocephalic and atraumatic. Nose: Nose normal.   Eyes: Conjunctivae and EOM are normal. Pupils are equal, round, and reactive to light. Right eye exhibits no discharge. Left eye exhibits no discharge. Neck: Normal range of motion. Neck supple. No tracheal deviation present. Cardiovascular: Normal rate, regular rhythm, normal heart sounds and intact distal pulses. Pulmonary/Chest: Effort normal and breath sounds normal. No stridor. No respiratory distress. She has no wheezes. She has no rales. She exhibits no tenderness. Abdominal: Soft. Bowel sounds are normal. There is no tenderness. There is no rebound and no guarding. Genitourinary:   Genitourinary Comments: Pelvic inspection done with female nurse chaperone present. There is evidence of spontaneous , I'm able to visualize fetal legs as well as umbilical cord, no active hemorrhage. Musculoskeletal: Normal range of motion. She exhibits no edema or tenderness. Neurological: She is alert and oriented to person, place, and time. No cranial nerve deficit. Coordination normal.   Skin: Skin is warm and dry. No rash noted. She is not diaphoretic. No erythema. Psychiatric: She has a normal mood and affect. Her behavior is normal. Judgment normal.       MEDICAL DECISION MAKING:   MDM  Patient is having a spontaneous . I consult the GYN immediately. I contacted our NICU at [facility], they only do  resuscitation for 23 weeks above. Patient's fetus has a gestational age of 25 weeks and 3 days, and was planning undergoing elective  this next week because her water broke earlier this week and was told that the fetus has severe congenital abnormalities.   I don't think this is a viable fetus, I am not delivering this fetus and not performing  resuscitation on this fetus.  Choosing to let the patient have a spontaneous miscarriage rather than extract the fetus because I don't want to induce a severe hemorrhage. I have consulted GYN and awaiting their arrival.  She is given IV fluids, ordered type and screen, CBC, CMP, coags. Reviewed her medical records, is a note from Matt Bahena done yesterday, patient has PPROM. 12:59 AM  OB has taken this patient to labor and delivery. Procedures    DIAGNOSTIC RESULTS     LABS: All lab results were reviewed by myself, and all abnormals are listed below. Labs Reviewed   CBC WITH AUTO DIFFERENTIAL - Abnormal; Notable for the following:        Result Value    WBC 16.1 (*)     RBC 3.71 (*)     Hemoglobin 11.9 (*)     Hematocrit 34.6 (*)     All other components within normal limits   COMPREHENSIVE METABOLIC PANEL   MAGNESIUM   PROTIME-INR   TYPE AND SCREEN     EMERGENCY DEPARTMENT COURSE:     Vitals:    Vitals:    17 0040   BP: 114/66   Pulse: 91   Resp: 24   Temp: 97.6 °F (36.4 °C)   TempSrc: Oral   SpO2: 100%   Weight: 153 lb (69.4 kg)   Height: 5' 5\" (1.651 m)     The patient was given the following medications while in the emergency department:  Orders Placed This Encounter   Medications    0.9 % sodium chloride bolus     CONSULTS:  IP CONSULT TO OB GYN    FINAL IMPRESSION      1.  Spontaneous           DISPOSITION/PLAN   DISPOSITION   Transferred to labor and delivery    Ingrid Yoder MD  Attending Emergency Physician                      Ingrid Yoder MD  17 0100

## 2017-11-11 NOTE — H&P
[facility]    Date: 11/10/2017       Time: 8:29 PM   Patient Name: Darrin Toscano     Patient : 1990  Room/Bed: 6886/5442-66    Admission Date/Time: 11/10/2017  7:22 PM      CC: PPROM     HPI: Darrin Toscano is a 33 y.o. [place] Chance at [address] who presents to labor and delivery complaining of PPROM. She states on Tuesday while using the restroom she felt a large gush of fluid from her vagina. She then went to [facility] where they diagnosed her with PPROM and planned an induction and transfer to [place]. When she went to [place] they discovered a diaphragmatic hernia in the fetus during an [facility] ultrasound. Per the patient, she was told that she could not be induced because fetal heart tones were present. She was then referred to a clinic in [place] to help her with terminating the pregnancy. After providing her medical records, she was informed by the clinic that they could not facilitate a medical  because her membranes were ruptured. She then decided to come here to see what help could be provided. Throughout this week she has had occasional vaginal spotting and a constant lower back ache she rates as a 5-6/10 and does not radiate. Yesterday she took tylenol that relieved some of her pain. Thus far the patient states her pregnancy has been uncomplicated and she states she was compliant with her prenatal care which included numerous ultrasounds to check her cervical length due to a history of a LEEP procedure in 2016. She denies signs of infection including fever and chills. She denies chest pain, palpitations, nausea, and vomiting. Patient reports denies fetal movement, denies any contractions, + loss of fluid, or + vaginal bleeding. 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      DATING:  LMP: Patient's last menstrual period was 2017 (approximate). Estimated Date of Delivery: 3/27/18   Based on: early ultrasound, at 7 1/7 weeks GA    PREGNANCY RISK FACTORS:  Patient Active Problem List   Diagnosis    LGSIL of cervix of undetermined significance    Positive pregnancy test    HSIL on Pap smear of cervix    H/O LEEP    Tobacco use        Steroids Given In This Pregnancy:  no     REVIEW OF SYSTEMS:  Constitutional: negative fever, negative chills   HEENT: negative visual disturbances, negative headaches  Respiratory: negative dyspnea, negative cough  Cardiovascular: negative chest pain,  negative palpitations  Gastrointestinal: negative abdominal pain, negative RUQ pain, negative N/V, negative diarrhea, negative constipation  Genitourinary: negative dysuria, negative vaginal discharge  Dermatological: negative rash  Hematologic: negative bruising  Immunologic/Lymphatic: negative recent illness, negative recent sick contact  Musculoskeletal: negative back pain, negative myalgias, negative arthralgias  Neurological:  negative dizziness, negative weakness  Behavior/Psych: negative depression, negative anxiety    OBSTETRICAL HISTORY:   Obstetric History       T1      L1     SAB0   TAB0   Ectopic0   Molar0   Multiple0   Live Births1       # Outcome Date GA Lbr Truman/2nd Weight Sex Delivery Anes PTL Lv   2 Current            1 Term 11    F Vag-Spont   MULU          PAST MEDICAL HISTORY:   has no past medical history on file. PAST SURGICAL HISTORY:   has a past surgical history that includes LEEP (16). ALLERGIES:  has No Known Allergies. MEDICATIONS:  Prior to Admission medications    Medication Sig Start Date End Date Taking?  Authorizing Provider   Prenatal Vit-Fe Fumarate-FA (PRENATAL VITAMINS PLUS) 27-1 MG TABS tablet Take 1 tablet by mouth daily 17  Yes Elias Massey MD   Pyridoxine HCl (VITAMIN B-6) 50 MG tablet Take 1 tablet by mouth daily 17  Yes Elias Massey MD   doxyLAMINE succinate intrauterine growth restriction, intrauterine fetal demise, and abruptio placenta. Secondary smoke risks were reviewed. Increased risks of respiratory problems, asthma,cancer and sudden infant death syndrome were reviewed.  LGSIL of cervix of undetermined significance 03/24/2016         Steroids given this admission: No    Risks, benefits, alternatives and possible complications have been discussed in detail with the patient. Admission, and post admission procedures and expectations were discussed in detail. All questions were answered. Attending's Name: Dr. Miesha Daugherty  OMS-4   11/10/17 9:09 pm       Resident Physician Statement  I have discussed the case, including pertinent history and exam findings with the above medical student. I have personally seen the patient. I agree with the assessment, plan and orders as documented. I have made changes to the above note as needed. I have discussed the case with above named attending. All discharge instructions reviewed with the patient. Patient is in agreement with plan. All questions answered.     DO COLLETTE Tapia Resident PGY-3  Pgr: [phone]  11/10/2017  9:09 PM

## 2017-11-11 NOTE — H&P
diaphragmatic hernia    H/O marijuana use        Steroids Given In This Pregnancy:  no     REVIEW OF SYSTEMS:    Constitutional: negative fever, negative chills  HEENT: negative visual disturbances, negative headaches  Respiratory: negative dyspnea, negative cough  Cardiovascular: negative chest pain,  negative palpitations  Gastrointestinal: negative Abdominal pain, negative RUQ pain, negative N/V/D, negative constipation  Genitourinary: negative dysuria, negative vaginal discharge, positive vaginal pressure  Dermatological: negative rash, negative wounds  Hematologic: negative bleeding/clotting disorder  Immunologic: negative recent illness, negative recent sick contact, negative allergic reactions  Lymphatic: negative lymph nodes  Musculoskeletal: negative back pain, negative myalgias, negative arthralgias  Neurological:  negative dizziness, negative weakness  Behavior/Psych: negative depression, negative anxiety      OB HISTORY:   Obstetric History       T1      L1     SAB0   TAB0   Ectopic0   Molar0   Multiple0   Live Births1       # Outcome Date GA Lbr Truman/2nd Weight Sex Delivery Anes PTL Lv   2 Current            1 Term 11    F Vag-Spont   MULU          PAST MEDICAL HISTORY:  History reviewed. No pertinent past medical history. PAST SURGICAL HISTORY:  Past Surgical History:   Procedure Laterality Date    LEEP  16       ALLERGIES:  Allergies as of 2017    (No Known Allergies)       MEDICATIONS:  No current facility-administered medications on file prior to encounter.       Current Outpatient Prescriptions on File Prior to Encounter   Medication Sig Dispense Refill    Prenatal Vit-Fe Fumarate-FA (PRENATAL VITAMINS PLUS) 27-1 MG TABS tablet Take 1 tablet by mouth daily 30 tablet 0    Pyridoxine HCl (VITAMIN B-6) 50 MG tablet Take 1 tablet by mouth daily 30 tablet 0    doxyLAMINE succinate (UNISOM) 25 MG tablet Take 1 tablet by mouth nightly as needed for Sleep 30 3/14/2016 HSIL      H/O LEEP 2017: LEEP showed MARILYN 3      Tobacco use 2017     The patient was counseled on tobacco abuse. Maternal and fetal risks were reviewed. The patient was instructed to stop smoking. Morbidity, mortality, and cessation programs were reviewed. Risks reviewed include but are not limited to  labor,  delivery, premature rupture of membranes, intrauterine growth restriction, intrauterine fetal demise, and abruptio placenta. Secondary smoke risks were reviewed. Increased risks of respiratory problems, asthma,cancer and sudden infant death syndrome were reviewed.  LGSIL of cervix of undetermined significance 2016       Plan discussed with Dr. Murali Medina, who is agreeable. Steroids given this admission: No    Risks, benefits, alternatives and possible complications have been discussed in detail with the patient. Admission, and post admission procedures and expectations were discussed in detail. All questions were answered.     Attending's Name: Dr. Gerhardt Ales, DO  Ob/Gyn Resident  Pager [phone]   2017, 1:34 AM

## 2017-11-11 NOTE — FLOWSHEET NOTE
Dr. Lamin Duenas discusses POC with [facility] physician.

## 2017-11-11 NOTE — ANESTHESIA POSTPROCEDURE EVALUATION
POST- ANESTHESIA EVALUATION       Pt Name: Jose Alberto Man  MRN: [id number]  YOB: 1990  Date of evaluation: 11/11/2017  Time:  12:22 PM      BP (!) 91/49   Pulse 75   Temp 99 °F (37.2 °C)   Resp 16   Ht 5' 5\" (1.651 m)   Wt 153 lb (69.4 kg)   LMP 06/03/2017 (Approximate)   SpO2 100%   BMI 25.46 kg/m²      Consciousness Level  Awake  Cardiopulmonary Status  Stable  Pain Adequately Treated YES  Nausea / Vomiting  NO  Adequate Hydration  YES  Anesthesia Related Complications NONE      Electronically signed by Tommy Betts MD on 11/11/2017 at 12:22 PM       Department of Anesthesiology  Postprocedure Note    Patient: Jose Alberto Man  MRN: [id number]  YOB: 1990  Date of evaluation: 11/11/2017  Time:  12:22 PM     Procedure Summary     Date:  11/11/17 Room / Location:      Anesthesia Start:  0135 Anesthesia Stop:  1001    Procedure:  Suction D&C Diagnosis:      Scheduled Providers:   Responsible Provider:  Tommy Betts MD    Anesthesia Type:  general ASA Status:  2 - Emergent          Anesthesia Type: general    Raul Phase I: Raul Score: 8    Raul Phase II:      Last vitals: Reviewed and per EMR flowsheets.        Anesthesia Post Evaluation

## 2017-11-11 NOTE — ANESTHESIA PRE PROCEDURE
Neuro/Psych:   {neg ROS              GI/Hepatic/Renal: neg ROS            Endo/Other: negative ROS                    Abdominal:           Vascular:                                      Anesthesia Plan      general     ASA 2 - emergent       Induction: intravenous. Anesthetic plan and risks discussed with patient. Plan discussed with CRNA.                   Alise Virgen MD   11/11/2017

## 2017-11-11 NOTE — PROGRESS NOTES
Progress Note    Lala Hernandez is a 33 y.o. female at 22w2d      The patient was seen and examined. Her pain is not well controlled. She is requesting that \"something be done to make this happen faster. \"    Vital Signs:  Vitals:    17 0108   BP: (!) 100/56   Pulse: 90   Resp: 20   Temp: 98.7 °F (37.1 °C)   TempSrc: Oral     Pelvic Exam: exam is stable from initial presentation, unable to sweep the fetal arms to facilitate delivery      Assessment/Plan:  Lala Hernandez is a 33 y.o. female  20w4d      GBS negative / Rh positive / R immune   - No indication for GBS prophylaxis     labor and delivery of fetal demise   - breech fetus with incomplete delivery, fetal arms and head remain above the cervix   - s/p nubain, morphine/phenergan prn pain   - s/p cytotec 400mcg buccally x1, next dose due @ [id number]   - will consult anesthesia for additional pain control options    Dr. Bertin Castillo updated and in agreement.     Evaristo Zhu, DO  Ob/Gyn Resident   2017, 3:01 AM

## 2017-11-11 NOTE — ANESTHESIA POSTPROCEDURE EVALUATION
POST- ANESTHESIA EVALUATION       Pt Name: Maico Coleman  MRN: [id number]  YOB: 1990  Date of evaluation: 2017  Time:  5:42 PM      BP (!) 81/45   Pulse 67   Temp 98.9 °F (37.2 °C) (Oral)   Resp 16   Ht 5' 5\" (1.651 m)   Wt 153 lb (69.4 kg)   LMP 2017 (Approximate)   SpO2 100%   BMI 25.46 kg/m²      Consciousness Level  Awake  Cardiopulmonary Status  Stable  Pain Adequately Treated YES  Nausea / Vomiting  NO  Adequate Hydration  YES  Anesthesia Related Complications NONE      Electronically signed by Alise Virgen MD on 2017 at 5:42 PM       Department of Anesthesiology  Postprocedure Note    Patient: Maico Coleman  MRN: [id number]  YOB: 1990  Date of evaluation: 2017  Time:  5:42 PM     Procedure Summary     Date:  17 Room / Location:  [facility] L&D OR    Anesthesia Start:   Anesthesia Stop:      Procedures:        SECTION (N/A )      DILATATION AND CURETTAGE (N/A Vagina )      ANESTHESIA LABOR ANALGESIA Diagnosis:  (Other)    Surgeon:  Emily Jefferson DO Responsible Provider:  Maddie Hale MD    Anesthesia Type:  epidural ASA Status:  2          Anesthesia Type: epidural    Raul Phase I: Raul Score: 8    Raul Phase II: Raul Score: 10    Last vitals: Reviewed and per EMR flowsheets.        Anesthesia Post Evaluation

## 2017-11-12 PROCEDURE — 2500000003 HC RX 250 WO HCPCS

## 2017-11-12 NOTE — ANESTHESIA POSTPROCEDURE EVALUATION
POST- ANESTHESIA EVALUATION       Pt Name: Elisa Aldana  MRN: [id number]  YOB: 1990  Date of evaluation: 2017  Time:  6:57 AM      BP (!) 81/45   Pulse 67   Temp 98.9 °F (37.2 °C) (Oral)   Resp 16   Ht 5' 5\" (1.651 m)   Wt 153 lb (69.4 kg)   LMP 2017 (Approximate)   SpO2 100%   BMI 25.46 kg/m²      Consciousness Level  Awake  Cardiopulmonary Status  Stable  Pain Adequately Treated YES  Nausea / Vomiting  NO  Adequate Hydration  YES  Anesthesia Related Complications NONE      Electronically signed by Homa Trujillo MD on 2017 at 6:57 AM       Department of Anesthesiology  Postprocedure Note    Patient: Elisa Aldana  MRN: [id number]  YOB: 1990  Date of evaluation: 2017  Time:  6:57 AM     Procedure Summary     Date:  17 Room / Location:  [facility] L&D OR    Anesthesia Start:  58 Anesthesia Stop:      Procedures:        SECTION (N/A )      DILATATION AND CURETTAGE (N/A Vagina )      ANESTHESIA LABOR ANALGESIA Diagnosis:  (Other)    Surgeon:  Gerri Hull DO Responsible Provider:  Carlos Mcdaniel MD    Anesthesia Type:  epidural ASA Status:  2          Anesthesia Type: epidural    Raul Phase I: Raul Score: 8    Ralu Phase II: Raul Score: 10    Last vitals: Reviewed and per EMR flowsheets.        Anesthesia Post Evaluation

## 2017-11-15 LAB — SURGICAL PATHOLOGY REPORT: NORMAL

## 2020-05-27 ENCOUNTER — HOSPITAL ENCOUNTER (EMERGENCY)
Age: 30
Discharge: HOME | End: 2020-05-27
Attending: EMERGENCY MEDICINE

## 2020-05-27 VITALS
BODY MASS INDEX: 26.66 KG/M2 | HEART RATE: 64 BPM | RESPIRATION RATE: 18 BRPM | DIASTOLIC BLOOD PRESSURE: 63 MMHG | SYSTOLIC BLOOD PRESSURE: 106 MMHG | HEIGHT: 65 IN | WEIGHT: 160 LBS | OXYGEN SATURATION: 98 % | TEMPERATURE: 98.3 F

## 2020-05-27 PROCEDURE — 6370000000 HC RX 637 (ALT 250 FOR IP): Performed by: NURSE PRACTITIONER

## 2020-05-27 PROCEDURE — 99283 EMERGENCY DEPT VISIT LOW MDM: CPT

## 2020-05-27 RX ORDER — HYDROXYZINE HYDROCHLORIDE 10 MG/1
10 TABLET, FILM COATED ORAL ONCE
Status: COMPLETED | OUTPATIENT
Start: 2020-05-27 | End: 2020-05-27

## 2020-05-27 RX ORDER — HYDROXYZINE HYDROCHLORIDE 25 MG/1
25 TABLET, FILM COATED ORAL EVERY 6 HOURS PRN
Qty: 10 TABLET | Refills: 0 | Status: SHIPPED | OUTPATIENT
Start: 2020-05-27 | End: 2020-06-06

## 2020-05-27 RX ADMIN — HYDROXYZINE HYDROCHLORIDE 10 MG: 10 TABLET, FILM COATED ORAL at 18:46

## 2020-05-27 ASSESSMENT — ENCOUNTER SYMPTOMS
CHEST TIGHTNESS: 1
VOMITING: 0
SHORTNESS OF BREATH: 0
PHOTOPHOBIA: 0
NAUSEA: 0
COLOR CHANGE: 0
SINUS PRESSURE: 0
FACIAL SWELLING: 0

## 2020-05-27 NOTE — ED NOTES
Ambulated to room. Took half of a 70 mg vyvanse. Now is having a panic attack. Has had 1 prior panic attack. RR is elevated and crying. Skin is warm, dry, and intact. A+Ox4. Patient also had a recent stillbirth.           Irish Vasques RN  05/27/20 4898

## 2020-05-27 NOTE — ED PROVIDER NOTES
[address] [place] [address] ED  EMERGENCY DEPARTMENT ENCOUNTER      Pt Name: Tad Mcgovern  MRN: [id number]  [place] 1990  Date of evaluation: 5/27/20  CHIEF COMPLAINT       Chief Complaint   Patient presents with    Medication Reaction    Panic Attack     HISTORY OF PRESENT ILLNESS   Presents the emergency room with complaints of panic attack after taking 30 mg of Vyvanse. .  She states that approximately 2 hours prior to arrival she took a half of a 70 mg tablet of Vyvanse recreationally. Since then she is started to have a panic attack. She states that she feels anxious and shaky. The history is provided by the patient. REVIEW OF SYSTEMS     Review of Systems   Constitutional: Negative for activity change and fever. HENT: Negative for congestion, facial swelling and sinus pressure. Eyes: Negative for photophobia and visual disturbance. Respiratory: Positive for chest tightness. Negative for shortness of breath. Cardiovascular: Positive for palpitations. Negative for chest pain. Gastrointestinal: Negative for nausea and vomiting. Musculoskeletal: Negative for arthralgias and myalgias. Skin: Negative for color change and rash. Neurological: Positive for light-headedness. Negative for syncope and weakness. Psychiatric/Behavioral: Negative for confusion, hallucinations, self-injury and suicidal ideas. The patient is nervous/anxious. PASTMEDICAL HISTORY   History reviewed. No pertinent past medical history.   SURGICAL HISTORY       Past Surgical History:   Procedure Laterality Date    LEEP  4/26/16    [place] NERVOUS SYSTEM SURGERY UNLISTED N/A 11/11/2017    EXAMINATION UNDER ANESTHESIA performed by Gala Carreon DO at [facility] L&D OR     CURRENT MEDICATIONS       Previous Medications    DOCUSATE SODIUM (COLACE) 100 MG CAPSULE    Take 1 capsule by mouth 2 times daily as needed for Constipation    IBUPROFEN (ADVIL;MOTRIN) 800 MG TABLET    Take 1 tablet by mouth every 8 hours as needed for

## 2020-06-13 ENCOUNTER — HOSPITAL ENCOUNTER (EMERGENCY)
Age: 30
Discharge: HOME | End: 2020-06-13
Attending: EMERGENCY MEDICINE

## 2020-06-13 VITALS
WEIGHT: 163.5 LBS | DIASTOLIC BLOOD PRESSURE: 87 MMHG | TEMPERATURE: 98 F | RESPIRATION RATE: 16 BRPM | HEIGHT: 65 IN | OXYGEN SATURATION: 100 % | HEART RATE: 111 BPM | BODY MASS INDEX: 27.24 KG/M2 | SYSTOLIC BLOOD PRESSURE: 138 MMHG

## 2020-06-13 LAB
CHP ED QC CHECK: NORMAL
PREGNANCY TEST URINE, POC: NORMAL

## 2020-06-13 PROCEDURE — 81025 URINE PREGNANCY TEST: CPT

## 2020-06-13 PROCEDURE — 81003 URINALYSIS AUTO W/O SCOPE: CPT

## 2020-06-13 PROCEDURE — 99283 EMERGENCY DEPT VISIT LOW MDM: CPT

## 2020-06-13 RX ORDER — HYDROXYZINE HYDROCHLORIDE 25 MG/1
25 TABLET, FILM COATED ORAL 3 TIMES DAILY PRN
COMMUNITY

## 2020-06-13 RX ORDER — HYDROXYZINE HYDROCHLORIDE 10 MG/1
10 TABLET, FILM COATED ORAL EVERY 8 HOURS PRN
Qty: 15 TABLET | Refills: 0 | Status: SHIPPED | OUTPATIENT
Start: 2020-06-13 | End: 2020-06-18

## 2020-06-13 ASSESSMENT — ENCOUNTER SYMPTOMS
COLOR CHANGE: 0
VOMITING: 0
COUGH: 0
SHORTNESS OF BREATH: 0
NAUSEA: 0

## 2020-06-13 NOTE — DISCHARGE INSTR - COC
Continuity of Care Form    Patient Name: Daryle Lower   :  1990  MRN:  [id number]    Admit date:  2020  Discharge date:  ***    Code Status Order: Prior   Advance Directives:     Admitting Physician:  No admitting provider for patient encounter. PCP: No primary care provider on file. Discharging Nurse: [facility] Unit/Room#: [address]  Discharging Unit Phone Number: ***    Emergency Contact:   Extended Emergency Contact Information  Primary Emergency Contact: Drew Pabon  Address: NA  Home Phone: [phone]  Work Phone: [phone]  Mobile Phone: [phone]  Relation: Other    Past Surgical History:  Past Surgical History:   Procedure Laterality Date    LEEP  16    [place] NERVOUS SYSTEM SURGERY UNLISTED N/A 2017    EXAMINATION UNDER ANESTHESIA performed by Aura Zarate DO at [address] [place] [address] L&D OR       Immunization History: There is no immunization history on file for this patient. Active Problems:  Patient Active Problem List   Diagnosis Code    LGSIL of cervix of undetermined significance R87.612    HSIL on Pap smear of cervix R87.613    H/O LEEP Z98.890    Tobacco use Z72.0    PPROM 17 O42. 80    PTL O60.02    Fetal diaphragmatic hernia [id number]    H/O marijuana use Z87.898    Fetal demise (G2, 22w2d) O36. 4XX0   [facility]  M demise 17 O80    20 weeks gestation of pregnancy Z3A.20     premature rupture of membranes (PPROM) with unknown onset of labor O37.36    Retained complete placenta O73.0       Isolation/Infection:   Isolation          No Isolation        Patient Infection Status     None to display          Nurse Assessment:  Last Vital Signs: There were no vitals taken for this visit.     Last documented pain score (0-10 scale):    Last Weight:   Wt Readings from Last 1 Encounters:   20 160 lb (72.6 kg)     Mental Status:  {IP PT MENTAL STATUS:}    IV Access:  [facility] IV ACCESS:989924831}    Nursing Mobility/ADLs:  Walking {CHP DME LQKZ:009308830}  Transfer  {CHP DME MFEZ:864888199}  Bathing  {CHP DME LJBM:676588738}  Dressing  {CHP DME WORR:889853529}  Toileting  {CHP DME WXZR:793280895}  Feeding  {CHP DME WJKV:526185315}  Med Admin  {CHP DME TZCL:960867819}  Med Delivery   { NANCY MED Delivery:341516582}    Wound Care Documentation and Therapy:  Incision (Active)   Number of days:         Elimination:  Continence:   · Bowel: {YES / OA:41910}  · Bladder: {YES / AR:66702}  Urinary Catheter: {Urinary Catheter:452594318}   Colostomy/Ileostomy/Ileal Conduit: {YES / HU:38809}       Date of Last BM: ***  No intake or output data in the 24 hours ending 20 1112  No intake/output data recorded.     Safety Concerns:     [employer] Safety Concerns:647515495}    Impairments/Disabilities:      [employer] Impairments/Disabilities:855673786}    Nutrition Therapy:  Current Nutrition Therapy:   [employer] Diet List:062195032}    Routes of Feeding: {[facility] DME Other Feedings:599122886}  Liquids: {Slp liquid thickness:62540}  Daily Fluid Restriction: {CHP DME Yes amt example:727669156}  Last Modified Barium Swallow with Video (Video Swallowing Test): {Done Not Done JN:601400201}    Treatments at the Time of Hospital Discharge:   Respiratory Treatments: ***  Oxygen Therapy:  {Therapy; copd oxygen:38297}  Ventilator:    { CC Vent MTCF:211206364}    Rehab Therapies: {THERAPEUTIC INTERVENTION:2687210685}  Weight Bearing Status/Restrictions: [employer] Weight Bearin}  Other Medical Equipment (for information only, NOT a DME order):  {EQUIPMENT:948652146}  Other Treatments: ***    Patient's personal belongings (please select all that are sent with patient):  {[facility] DME Belongings:440774480}    RN SIGNATURE:  {Esignature:807112621}    CASE MANAGEMENT/SOCIAL WORK SECTION    Inpatient Status Date: ***    Readmission Risk Assessment Score:  Readmission Risk              Risk of Unplanned Readmission:        0           Discharging to Facility/ Agency

## 2020-06-13 NOTE — ED NOTES
Pt ambulatory to room. Pt c/o episode of anxiety. Pt states she recently took some of her friends adderral which made her anxious, then pt came to ED and was given atarax which made pt extremely sleepy. Pt A&Ox3, tearful, respirations equal and unlabored bilat, skin warm and dry.      Andrew Bennett, RN  06/13/20 1048

## 2020-06-13 NOTE — ED PROVIDER NOTES
Affect is tearful. Speech: Speech is rapid and pressured. Behavior: Behavior is agitated. Thought Content: Thought content does not include homicidal or suicidal ideation. Thought content does not include homicidal or suicidal plan. DIAGNOSTIC RESULTS     LABS:  Labs Reviewed   POCT URINE PREGNANCY - Normal       All other labs were within normal range or not returned as of this dictation. EMERGENCY DEPARTMENT COURSE and DIFFERENTIAL DIAGNOSIS/MDM:   Vitals:    Vitals:    20 1116   BP: 138/87   Pulse: 111   Resp: 16   Temp: 98 °F (36.7 °C)   SpO2: 100%   Weight: 163 lb 8 oz (74.2 kg)   Height: 5' 5\" (1.651 m)       CLINICAL DECISION MAKING:  The patient presented alert with a nontoxic appearance and was seen in conjunction with Dr. Torey López. Urine pregnancy was negative. She was given a prescription for hydroxyzine with a lower dose. She was given psychiatric and counseling services for follow up. She was encouraged to establish care with a pcp for follow up. Return to ED if condition worsens. Reports she has a ride home. FINAL IMPRESSION      1. Anxiety state            Problem List  Patient Active Problem List   Diagnosis Code    LGSIL of cervix of undetermined significance R87.612    HSIL on Pap smear of cervix R87.613    H/O LEEP Z98.890    Tobacco use Z72.0    PPROM 17 O42. 80    PTL O60.02    Fetal diaphragmatic hernia [id number]    H/O marijuana use Z87.898    Fetal demise (G2, 22w2d) O36. 4XX0   [facility] OTERO demise 17 O80    20 weeks gestation of pregnancy Z3A.20     premature rupture of membranes (PPROM) with unknown onset of labor O37.36    Retained complete placenta O73.0         DISPOSITION/PLAN   DISPOSITION Decision To Discharge 2020 11:12:41 AM      PATIENT REFERRED TO:   Call Murlean Spurling to establish care for follow up    Schedule an appointment as soon as possible for a visit       [facility] ED  [place] [address]

## 2020-06-15 LAB — HCG, PREGNANCY URINE (POC): NEGATIVE

## 2022-07-08 NOTE — FLOWSHEET NOTE
RN calls [employer] and informs of IUFD. RN speaks with Daniel Falcon at [employer]. Reference #: [id number]. No

## 2024-09-20 ENCOUNTER — APPOINTMENT (OUTPATIENT)
Dept: GENERAL RADIOLOGY | Age: 34
End: 2024-09-20
Payer: COMMERCIAL

## 2024-09-20 ENCOUNTER — HOSPITAL ENCOUNTER (EMERGENCY)
Age: 34
Discharge: HOME | End: 2024-09-20
Attending: EMERGENCY MEDICINE
Payer: COMMERCIAL

## 2024-09-20 VITALS
SYSTOLIC BLOOD PRESSURE: 103 MMHG | RESPIRATION RATE: 22 BRPM | HEART RATE: 70 BPM | DIASTOLIC BLOOD PRESSURE: 72 MMHG | TEMPERATURE: 98.6 F | OXYGEN SATURATION: 98 %

## 2024-09-20 DIAGNOSIS — R00.2 HEART PALPITATIONS: Primary | ICD-10-CM

## 2024-09-20 LAB
ANION GAP SERPL CALCULATED.3IONS-SCNC: 12 MMOL/L (ref 9–16)
BASOPHILS # BLD: 0.05 K/UL (ref 0–0.2)
BASOPHILS NFR BLD: 1 % (ref 0–2)
BUN SERPL-MCNC: 9 MG/DL (ref 6–20)
CALCIUM SERPL-MCNC: 9.1 MG/DL (ref 8.6–10.4)
CHLORIDE SERPL-SCNC: 105 MMOL/L (ref 98–107)
CO2 SERPL-SCNC: 20 MMOL/L (ref 20–31)
CREAT SERPL-MCNC: 0.7 MG/DL (ref 0.5–0.9)
EOSINOPHIL # BLD: 0.17 K/UL (ref 0–0.44)
EOSINOPHILS RELATIVE PERCENT: 2 % (ref 1–4)
ERYTHROCYTE [DISTWIDTH] IN BLOOD BY AUTOMATED COUNT: 12.2 % (ref 11.8–14.4)
GFR, ESTIMATED: >90 ML/MIN/1.73M2
GLUCOSE SERPL-MCNC: 123 MG/DL (ref 74–99)
HCG SERPL QL: NEGATIVE
HCT VFR BLD AUTO: 38.6 % (ref 36.3–47.1)
HGB BLD-MCNC: 13.3 G/DL (ref 11.9–15.1)
IMM GRANULOCYTES # BLD AUTO: 0.03 K/UL (ref 0–0.3)
IMM GRANULOCYTES NFR BLD: 0 %
LYMPHOCYTES NFR BLD: 3.37 K/UL (ref 1.1–3.7)
LYMPHOCYTES RELATIVE PERCENT: 40 % (ref 24–43)
MCH RBC QN AUTO: 30.3 PG (ref 25.2–33.5)
MCHC RBC AUTO-ENTMCNC: 34.5 G/DL (ref 28.4–34.8)
MCV RBC AUTO: 87.9 FL (ref 82.6–102.9)
MONOCYTES NFR BLD: 0.42 K/UL (ref 0.1–1.2)
MONOCYTES NFR BLD: 5 % (ref 3–12)
NEUTROPHILS NFR BLD: 52 % (ref 36–65)
NEUTS SEG NFR BLD: 4.46 K/UL (ref 1.5–8.1)
NRBC BLD-RTO: 0 PER 100 WBC
PLATELET # BLD AUTO: 218 K/UL (ref 138–453)
PMV BLD AUTO: 10.3 FL (ref 8.1–13.5)
POTASSIUM SERPL-SCNC: 3.5 MMOL/L (ref 3.7–5.3)
RBC # BLD AUTO: 4.39 M/UL (ref 3.95–5.11)
SODIUM SERPL-SCNC: 137 MMOL/L (ref 136–145)
TROPONIN I SERPL HS-MCNC: <6 NG/L (ref 0–14)
TROPONIN I SERPL HS-MCNC: <6 NG/L (ref 0–14)
TSH SERPL DL<=0.05 MIU/L-ACNC: 0.83 UIU/ML (ref 0.27–4.2)
WBC OTHER # BLD: 8.5 K/UL (ref 3.5–11.3)

## 2024-09-20 PROCEDURE — 6370000000 HC RX 637 (ALT 250 FOR IP)

## 2024-09-20 PROCEDURE — 84703 CHORIONIC GONADOTROPIN ASSAY: CPT

## 2024-09-20 PROCEDURE — 84443 ASSAY THYROID STIM HORMONE: CPT

## 2024-09-20 PROCEDURE — 84484 ASSAY OF TROPONIN QUANT: CPT

## 2024-09-20 PROCEDURE — 85025 COMPLETE CBC W/AUTO DIFF WBC: CPT

## 2024-09-20 PROCEDURE — 71046 X-RAY EXAM CHEST 2 VIEWS: CPT

## 2024-09-20 PROCEDURE — 99284 EMERGENCY DEPT VISIT MOD MDM: CPT

## 2024-09-20 PROCEDURE — 93005 ELECTROCARDIOGRAM TRACING: CPT | Performed by: EMERGENCY MEDICINE

## 2024-09-20 PROCEDURE — 80048 BASIC METABOLIC PNL TOTAL CA: CPT

## 2024-09-20 RX ADMIN — POTASSIUM BICARBONATE 40 MEQ: 782 TABLET, EFFERVESCENT ORAL at 18:41

## 2024-09-20 ASSESSMENT — ENCOUNTER SYMPTOMS
NAUSEA: 0
ABDOMINAL PAIN: 0
SHORTNESS OF BREATH: 1
VOMITING: 0

## 2024-09-21 LAB
EKG ATRIAL RATE: 75 BPM
EKG P AXIS: 7 DEGREES
EKG P-R INTERVAL: 160 MS
EKG Q-T INTERVAL: 388 MS
EKG QRS DURATION: 84 MS
EKG QTC CALCULATION (BAZETT): 433 MS
EKG R AXIS: 15 DEGREES
EKG T AXIS: 14 DEGREES
EKG VENTRICULAR RATE: 75 BPM

## 2024-09-23 LAB
EKG ATRIAL RATE: 75 BPM
EKG P AXIS: 7 DEGREES
EKG P-R INTERVAL: 160 MS
EKG Q-T INTERVAL: 388 MS
EKG QRS DURATION: 84 MS
EKG QTC CALCULATION (BAZETT): 433 MS
EKG R AXIS: 15 DEGREES
EKG T AXIS: 14 DEGREES
EKG VENTRICULAR RATE: 75 BPM

## (undated) DEVICE — STERILE LATEX POWDER-FREE SURGICAL GLOVESWITH NITRILE COATING: Brand: PROTEXIS

## (undated) DEVICE — SOLUTION IRRIG 1500ML STRL H2O USP POUR PLAS BTL

## (undated) DEVICE — CATHETERIZATION KIT PEDIATRIC 16 FR 5 CC INDWL INF CTRL

## (undated) DEVICE — ST CHARLES PERI-GYN PACK: Brand: MEDLINE INDUSTRIES, INC.

## (undated) DEVICE — SPONGE LAP W18XL18IN WHT COT 4 PLY FLD STRUNG RADPQ DISP ST